# Patient Record
Sex: FEMALE | Race: WHITE
[De-identification: names, ages, dates, MRNs, and addresses within clinical notes are randomized per-mention and may not be internally consistent; named-entity substitution may affect disease eponyms.]

---

## 2018-03-12 ENCOUNTER — HOSPITAL ENCOUNTER (INPATIENT)
Dept: HOSPITAL 17 - NEPE | Age: 58
DRG: 208 | End: 2018-03-12
Attending: INTERNAL MEDICINE | Admitting: INTERNAL MEDICINE
Payer: MEDICAID

## 2018-03-12 VITALS — RESPIRATION RATE: 26 BRPM | HEART RATE: 98 BPM | DIASTOLIC BLOOD PRESSURE: 40 MMHG | SYSTOLIC BLOOD PRESSURE: 80 MMHG

## 2018-03-12 VITALS — DIASTOLIC BLOOD PRESSURE: 66 MMHG | SYSTOLIC BLOOD PRESSURE: 123 MMHG | HEART RATE: 120 BPM | RESPIRATION RATE: 24 BRPM

## 2018-03-12 VITALS — DIASTOLIC BLOOD PRESSURE: 40 MMHG | RESPIRATION RATE: 26 BRPM | HEART RATE: 100 BPM | SYSTOLIC BLOOD PRESSURE: 80 MMHG

## 2018-03-12 VITALS — OXYGEN SATURATION: 97 %

## 2018-03-12 VITALS — BODY MASS INDEX: 23.71 KG/M2 | WEIGHT: 138.89 LBS | HEIGHT: 64 IN

## 2018-03-12 VITALS — DIASTOLIC BLOOD PRESSURE: 49 MMHG | HEART RATE: 96 BPM | SYSTOLIC BLOOD PRESSURE: 109 MMHG

## 2018-03-12 VITALS — TEMPERATURE: 97.4 F | HEART RATE: 126 BPM

## 2018-03-12 VITALS — OXYGEN SATURATION: 99 %

## 2018-03-12 VITALS — DIASTOLIC BLOOD PRESSURE: 66 MMHG | SYSTOLIC BLOOD PRESSURE: 123 MMHG

## 2018-03-12 VITALS — TEMPERATURE: 98.3 F

## 2018-03-12 DIAGNOSIS — Z79.52: ICD-10-CM

## 2018-03-12 DIAGNOSIS — J98.11: ICD-10-CM

## 2018-03-12 DIAGNOSIS — I10: ICD-10-CM

## 2018-03-12 DIAGNOSIS — E83.52: ICD-10-CM

## 2018-03-12 DIAGNOSIS — J44.9: ICD-10-CM

## 2018-03-12 DIAGNOSIS — A41.9: ICD-10-CM

## 2018-03-12 DIAGNOSIS — R32: ICD-10-CM

## 2018-03-12 DIAGNOSIS — C79.31: ICD-10-CM

## 2018-03-12 DIAGNOSIS — Z66: ICD-10-CM

## 2018-03-12 DIAGNOSIS — J96.20: ICD-10-CM

## 2018-03-12 DIAGNOSIS — I73.9: ICD-10-CM

## 2018-03-12 DIAGNOSIS — R65.21: ICD-10-CM

## 2018-03-12 DIAGNOSIS — R40.2432: ICD-10-CM

## 2018-03-12 DIAGNOSIS — M19.90: ICD-10-CM

## 2018-03-12 DIAGNOSIS — F41.9: ICD-10-CM

## 2018-03-12 DIAGNOSIS — Z98.1: ICD-10-CM

## 2018-03-12 DIAGNOSIS — C78.89: ICD-10-CM

## 2018-03-12 DIAGNOSIS — C79.51: ICD-10-CM

## 2018-03-12 DIAGNOSIS — E03.9: ICD-10-CM

## 2018-03-12 DIAGNOSIS — C34.90: ICD-10-CM

## 2018-03-12 DIAGNOSIS — J96.00: Primary | ICD-10-CM

## 2018-03-12 DIAGNOSIS — Z79.891: ICD-10-CM

## 2018-03-12 DIAGNOSIS — Z87.891: ICD-10-CM

## 2018-03-12 DIAGNOSIS — Z79.899: ICD-10-CM

## 2018-03-12 DIAGNOSIS — N17.9: ICD-10-CM

## 2018-03-12 DIAGNOSIS — I46.9: ICD-10-CM

## 2018-03-12 DIAGNOSIS — D50.9: ICD-10-CM

## 2018-03-12 DIAGNOSIS — E87.1: ICD-10-CM

## 2018-03-12 DIAGNOSIS — M81.0: ICD-10-CM

## 2018-03-12 DIAGNOSIS — Z51.5: ICD-10-CM

## 2018-03-12 DIAGNOSIS — E87.2: ICD-10-CM

## 2018-03-12 DIAGNOSIS — E78.5: ICD-10-CM

## 2018-03-12 DIAGNOSIS — C78.7: ICD-10-CM

## 2018-03-12 DIAGNOSIS — R74.0: ICD-10-CM

## 2018-03-12 DIAGNOSIS — E87.5: ICD-10-CM

## 2018-03-12 LAB
ALBUMIN SERPL-MCNC: 1.6 GM/DL (ref 3.4–5)
ALP SERPL-CCNC: 959 U/L (ref 45–117)
ALT SERPL-CCNC: 984 U/L (ref 10–53)
APAP SERPL-MCNC: (no result) MCG/ML (ref 10–30)
AST SERPL-CCNC: 4063 U/L (ref 15–37)
BILIRUB SERPL-MCNC: 1.3 MG/DL (ref 0.2–1)
BUN SERPL-MCNC: 87 MG/DL (ref 7–18)
CALCIUM SERPL-MCNC: 11.7 MG/DL (ref 8.5–10.1)
CHLORIDE SERPL-SCNC: 92 MEQ/L (ref 98–107)
CREAT SERPL-MCNC: 4.88 MG/DL (ref 0.5–1)
GFR SERPLBLD BASED ON 1.73 SQ M-ARVRAT: 8 ML/MIN (ref 89–?)
GLUCOSE SERPL-MCNC: 65 MG/DL (ref 74–106)
HCO3 BLD-SCNC: 7.7 MEQ/L (ref 21–32)
INR PPP: 1.8 RATIO
LACTIC ACID SEPSIS PROTOCOL: 16.3 MMOL/L (ref 0.4–2)
PROT SERPL-MCNC: 5.8 GM/DL (ref 6.4–8.2)
PROTHROMBIN TIME: 18.7 SEC (ref 9.8–11.6)
SODIUM SERPL-SCNC: 130 MEQ/L (ref 136–145)
TROPONIN I SERPL-MCNC: 0.26 NG/ML (ref 0.02–0.05)

## 2018-03-12 PROCEDURE — 83690 ASSAY OF LIPASE: CPT

## 2018-03-12 PROCEDURE — 80307 DRUG TEST PRSMV CHEM ANLYZR: CPT

## 2018-03-12 PROCEDURE — 87205 SMEAR GRAM STAIN: CPT

## 2018-03-12 PROCEDURE — 84484 ASSAY OF TROPONIN QUANT: CPT

## 2018-03-12 PROCEDURE — 82550 ASSAY OF CK (CPK): CPT

## 2018-03-12 PROCEDURE — 82140 ASSAY OF AMMONIA: CPT

## 2018-03-12 PROCEDURE — 31500 INSERT EMERGENCY AIRWAY: CPT

## 2018-03-12 PROCEDURE — 94002 VENT MGMT INPAT INIT DAY: CPT

## 2018-03-12 PROCEDURE — 85730 THROMBOPLASTIN TIME PARTIAL: CPT

## 2018-03-12 PROCEDURE — 83605 ASSAY OF LACTIC ACID: CPT

## 2018-03-12 PROCEDURE — 80053 COMPREHEN METABOLIC PANEL: CPT

## 2018-03-12 PROCEDURE — 84443 ASSAY THYROID STIM HORMONE: CPT

## 2018-03-12 PROCEDURE — 76705 ECHO EXAM OF ABDOMEN: CPT

## 2018-03-12 PROCEDURE — 87040 BLOOD CULTURE FOR BACTERIA: CPT

## 2018-03-12 PROCEDURE — 96375 TX/PRO/DX INJ NEW DRUG ADDON: CPT

## 2018-03-12 PROCEDURE — 93005 ELECTROCARDIOGRAM TRACING: CPT

## 2018-03-12 PROCEDURE — 87641 MR-STAPH DNA AMP PROBE: CPT

## 2018-03-12 PROCEDURE — 85610 PROTHROMBIN TIME: CPT

## 2018-03-12 PROCEDURE — 0T9B70Z DRAINAGE OF BLADDER WITH DRAINAGE DEVICE, VIA NATURAL OR ARTIFICIAL OPENING: ICD-10-PCS

## 2018-03-12 PROCEDURE — 99292 CRITICAL CARE ADDL 30 MIN: CPT

## 2018-03-12 PROCEDURE — 0BH17EZ INSERTION OF ENDOTRACHEAL AIRWAY INTO TRACHEA, VIA NATURAL OR ARTIFICIAL OPENING: ICD-10-PCS

## 2018-03-12 PROCEDURE — 82552 ASSAY OF CPK IN BLOOD: CPT

## 2018-03-12 PROCEDURE — 96365 THER/PROPH/DIAG IV INF INIT: CPT

## 2018-03-12 PROCEDURE — 71045 X-RAY EXAM CHEST 1 VIEW: CPT

## 2018-03-12 PROCEDURE — 06HY33Z INSERTION OF INFUSION DEVICE INTO LOWER VEIN, PERCUTANEOUS APPROACH: ICD-10-PCS

## 2018-03-12 PROCEDURE — 5A1935Z RESPIRATORY VENTILATION, LESS THAN 24 CONSECUTIVE HOURS: ICD-10-PCS

## 2018-03-12 NOTE — PD
HPI


Chief Complaint:  Respiratory Distress


Time Seen by Provider:  10:49


Travel History


International Travel<30 days:  No


Contact w/Intl Traveler<30days:  No


Traveled to known affect area:  No





History of Present Illness


HPI


The patient is approximately 59 years old and arrives by EMS transport.  She 

arrives by EMS. The initial call was due to shortness of breath and altered 

mental status.  History is limited due to patient's altered mental status with 

a GCS of 7 (eyes 4, verbal 2, motor 1).  EMS reports the O2 sat was in the 70s 

upon their arrival.  With use of a BVM O2 sat increased to the 90s.  Patient's 

has stage IV cancer.  She is a DNR which was confirmed through her significant 

other at 6 years Mr. Sharif Trevino ([phone]).  The patient required a 

awake intubation within about 20 minutes of her ER arrival.  The significant 

other arrived to the ER we discussed the patient's likely clinical course which 

did not exclude termination within the next few hours.  Decision at that point 

was made to keep the patient as comfortable as possible.  Blood pressure 

decreased to 70/50.





PFSH


Past Medical History


Cancer:  Yes


Radiation Therapy:  Yes


Tetanus Vaccination:  Unknown


Pregnant?:  Unknown





Past Surgical History


Surgical History:  Unable to Obtain





Social History


Alcohol Use:  No


Tobacco Use:  No


Substance Use:  No





Allergies-Medications


(Allergen,Severity, Reaction):  


Coded Allergies:  


     Penicillins (Verified  Allergy, Unknown, 3/12/18)


Reported Meds & Prescriptions





Reported Meds & Active Scripts


Active


Reported


Ditropan (Oxybutynin Chloride) 5 Mg Tab 5 Mg PO Q12HR


Integra Plus (Multi-Vit/Iron-Vit C-B12-Folic Acid) 191-210-0.01-1 mg Cap 1 Cap 

PO DAILY


Zyrtec (Cetirizine HCl) 10 Mg Tablet 10 Mg PO DAILY


Lisinopril 20 Mg Tab 20 Mg PO BID


Hydrochlorothiazide 12.5 Mg Cap 12.5 Mg PO DAILY


Tricor (Fenofibrate) 145 Mg Tab 145 Mg PO DAILY


     Takw with food.


Ergocalciferol 50,000 Unit Cap 50,000 Units PO SUNDAY


Plavix (Clopidogrel Bisulfate) 75 Mg Tab 75 Mg PO DAILY


Lipitor (Atorvastatin Calcium) 10 Mg Tab 20 Mg PO HS


Aspirin Adult Low Strength (Aspirin) 81 Mg Tabdr 81 Mg PO DAILY


Norvasc (Amlodipine Besylate) 5 Mg Tab 5 Mg PO DAILY


Ventolin Hfa 18 GM Inh (Albuterol Sulfate) 90 Mcg/Act Aer 2 Puff INH Q6H PRN


Prednisone 10 Mg Tab 10 Mg PO BID


Ferrous Sulfate 325 Mg (65 Mg Iron) Tablet 325 Mg PO TID


Xanax (Alprazolam) 0.5 Mg Tab 1 Mg PO TID PRN 14 Days


Morphine ER (Morphine Sulfate) 30 Mg Tab 30 Mg PO Q12HR 14 Days


Morphine IR (Morphine Sulfate) 15 Mg Tab 15 Mg PO Q4H PRN 14 Days








Review of Systems


ROS Limitations:  Clinical Condition, Altered Mental Status





Physical Exam


Narrative


GENERAL: Approximately 59-year-old female moderate to severe distress secondary 

to weakness and/or pain





Vital Signs








  Date Time  Temp Pulse Resp B/P (MAP) Pulse Ox O2 Delivery O2 Flow Rate FiO2


 


3/12/18 11:20  96  109/49 (69)    


 


3/12/18 11:02 98.3       


 


3/12/18 11:00  96 26   Nasal Cannula 5.00 


 


3/12/18 10:55  98 26 80/40 (53)  Nasal Cannula 5.00 


 


3/12/18 10:48  100 26 80/40 (53)    








SKIN: Warm and dry.


HEAD: Atraumatic. Normocephalic. 


EYES: Pupils equal round and reactive to light.


ENT: No nasal bleeding or discharge.  Mucous membranes pink and moist.


NECK: Trachea midline. No JVD. 


CARDIOVASCULAR: Heart rate is approximately 100.  Rhythm is regular.


RESPIRATORY: Lung sounds are diminished bilaterally.  The respiratory rate 

about 24.  There is no wheezing or rhonchi.


GASTROINTESTINAL: Large midline surgical incision appears well-healed.


MUSCULOSKELETAL: Extremities without clubbing, cyanosis, or edema. No obvious 

deformities. 


NEUROLOGICAL: GCS is approximately 9 (Eyes 4, verbal 1, motor 4). Cranial 

nerves appears symmetric.


PSYCHIATRIC: Unable to assess.





Data


Data


Last Documented VS





Vital Signs








  Date Time  Temp Pulse Resp B/P (MAP) Pulse Ox O2 Delivery O2 Flow Rate FiO2


 


3/12/18 15:10  120  123/66    


 


3/12/18 12:55     0   100


 


3/12/18 11:02 98.3       


 


3/12/18 11:00   26   Nasal Cannula 5.00 








Orders





 Orders


Electrocardiogram (3/12/18 10:49)


Ammonia (3/12/18 10:49)


Complete Blood Count With Diff (3/12/18 10:49)


Comprehensive Metabolic Panel (3/12/18 10:49)


Creatine Kinase (Cpk) (3/12/18 10:49)


Prothrombin Time / Inr (Pt) (3/12/18 10:49)


Act Partial Throm Time (Ptt) (3/12/18 10:49)


Troponin I (3/12/18 10:49)


Thyroid Stimulating Hormone (3/12/18 10:49)


Urinalysis - C+S If Indicated (3/12/18 10:49)


Lactic Acid Sepsis Protocol (3/12/18 10:49)


Arterial Blood Gas (Abg) (3/12/18 10:49)


Blood Culture (3/12/18 10:49)


Chest, Single Ap (3/12/18 10:49)


Ct Brain W/O Iv Contrast(Rout) (3/12/18 10:49)


Blood Glucose (3/12/18 10:49)


Ecg Monitoring (3/12/18 10:49)


Iv Access Insert/Monitor (3/12/18 10:49)


Oximetry (3/12/18 10:49)


Sodium Chloride 0.9% Flush (Ns Flush) (3/12/18 11:00)


Drug Screen, Random Urine (3/12/18 10:49)


Alcohol (Ethanol) (3/12/18 10:49)


Salicylates (Aspirin) (3/12/18 10:49)


Tylenol (Acetaminophen) (3/12/18 10:49)


Lipase (3/12/18 10:49)


Ct Abd/Pel W Iv Contrast(Rout) (3/12/18 10:49)


Etomidate Inj (Amidate Inj) (3/12/18 11:24)


Ct Pulmonary Angiogram (3/12/18 )


Dextrose 25% In Water Inj (D25w Inj) (3/12/18 11:45)


Dextrose 50% In Rody (Syr) Inj (D50w (Syr (3/12/18 11:35)


Lorazepam Inj (Ativan Inj) (3/12/18 12:00)


Morphine Inj (Morphine Inj) (3/12/18 12:00)


Calcium Gluconate Inj (Calcium Gluconate (3/12/18 13:00)


Insulin Human Regular Inj (Novolin R Inj (3/12/18 13:15)


Dextrose 50% In Rody (Vial) Inj (D50w (Vi (3/12/18 13:00)


Sodium Bicarbonate 8.4% Inj (Sodium Bica (3/12/18 13:00)


Sodium Polysty Sulfate Liq (Kayexalate L (3/12/18 13:00)


Albuterol Neb (Albuterol Neb) (3/12/18 13:00)


Lorazepam Inj (Ativan Inj) (3/12/18 13:15)


Morphine Inj (Morphine Inj) (3/12/18 13:15)


CKMB (3/12/18 11:20)


CKMB% (3/12/18 11:20)


Hospice Consult (3/12/18 13:43)


Norepinephrine-Dextrose Drip (Levophed-D (3/12/18 15:00)


Lactic Acid Sepsis Protocol (3/12/18 14:14)


Norepinephrine Inj (Levophed Inj) (3/12/18 14:17)


Norepinephrine Inj (Levophed Inj) (3/12/18 14:18)


Lactic Acid (3/12/18 18:00)


Lactic Acid (3/13/18 00:00)


Lactic Acid (3/13/18 06:00)


Lactic Acid (3/13/18 12:00)


Lactic Acid (3/13/18 18:00)


Lactic Acid (3/14/18 00:00)


Lactic Acid (3/14/18 06:00)


Lactic Acid (3/14/18 12:00)


Lactic Acid (3/14/18 18:00)


Lactic Acid (3/15/18 00:00)


Lactic Acid (3/15/18 06:00)


Lactic Acid (3/15/18 12:00)


Lactic Acid (3/15/18 18:00)


Lactic Acid (3/16/18 00:00)


Lactic Acid (3/16/18 06:00)


Lactic Acid (3/16/18 12:00)


Admit Order (Ed Use Only) (3/12/18 )


Cardiac Monitor / Telemetry [id number] (3/12/18 15:11)


Vital Signs (Adult) Q4H (3/12/18 15:11)


Diet Npo (3/12/18 Dinner)


Activity Bed Rest (3/12/18 15:11)





Labs





Laboratory Tests








Test


  3/12/18


11:20


 


Blood Urea Nitrogen 87 MG/DL 


 


Creatinine 4.88 MG/DL 


 


Random Glucose 65 MG/DL 


 


Total Protein 5.8 GM/DL 


 


Albumin 1.6 GM/DL 


 


Calcium Level 11.7 MG/DL 


 


Alkaline Phosphatase 959 U/L 


 


Aspartate Amino Transf


(AST/SGOT) 4063 U/L 


 


 


Alanine Aminotransferase


(ALT/SGPT) 984 U/L 


 


 


Total Bilirubin 1.3 MG/DL 


 


Sodium Level 130 MEQ/L 


 


Potassium Level 6.7 MEQ/L 


 


Chloride Level 92 MEQ/L 


 


Carbon Dioxide Level 7.7 MEQ/L 


 


Anion Gap 30 MEQ/L 


 


Estimat Glomerular Filtration


Rate 8 ML/MIN 


 


 


Lactic Acid Level 16.3 mmol/L 


 


Protein Corrected Calcium  MG/DL 


 


Ammonia 164 MCMOL/L 


 


Total Creatine Kinase 1487 U/L 


 


Creatine Kinase MB 20.0 NG/ML 


 


Creatine Kinase MB % 1.3 % 


 


Troponin I 0.26 NG/ML 


 


Lipase 668 U/L 


 


Thyroid Stimulating Hormone


3rd Gen 8.430 uIU/ML 


 


 


Salicylates Level 12.3 MG/DL 


 


Acetaminophen Level


  LESS THAN 2.0


MCG/ML


 


Ethyl Alcohol Level


  LESS THAN 3


MG/DL











MDM


Medical Decision Making


Medical Screen Exam Complete:  Yes


Emergency Medical Condition:  Yes


Medical Record Reviewed:  Yes


Differential Diagnosis


PE, failure to thrive, electrolyte imbalance, anemia, infectious process


Narrative Course


Please for the history of present illness.  Ongoing comfort measures will be 

provided.  Hospice consultation ordered.  Patient's significant other Sharif Trevino is attempting to contact the patient's sister who lives out of state. 





Multiple family members were contacted and ultimately in conjunction with 

hospice consultation it was determined to sustain all resuscitative efforts 

until family could arrive to see the patient.





CBC & BMP Diagram


3/12/18 11:20








Total Protein 5.8 L, Albumin 1.6 L, Calcium Level 11.7 *H, Alkaline Phosphatase 

959 H, Aspartate Amino Transf (AST/SGOT) 4063 H, Alanine Aminotransferase (ALT/

SGPT) 984 H, Total Bilirubin 1.3 H





Hyperkalemic therapy given.


Patient intubated without RSI medications when she became unresponsive however 

pulses remained intact throughout ER stay


Right femoral vein central line placed


d/w Dr Tree Brooks started


Critical Care Narrative


Aggregate critical care time was 75 minutes. Time to perform other separately 

billable procedures was not  


included in the critical care time. My time did not include minutes spent 

treating any other patients simultaneously or on  


activities that did not directly contribute to the patient's treatment.  





The services I provided to this patient were to treat and/or prevent clinically 

significant deterioration that could result  


in: Cardiopulmonary arrest, septic shock





I provided critical care services requiring my management, as noted below:


Chart data review, documentation time, medication orders and management, vital 

sign assessments/reviewing monitor data,  


ordering and reviewing lab tests, ordering and interpreting/reviewing x-rays 

and diagnostic studies, care of the patient  


and discussion of the patient with the admitting physicians.





Procedures


**Procedure Narrative**


After the risks and benefits were discussed the following procedure was 

performed:


INTUBATION: The patient was put in optimal position for the procedure.  Awake 

intubation was performed wall secondary to altered mental status The patient 

was intubated with a 7-5 cuffed endotracheal  


tube.  Tube placement was confirmed by visualization of the tube and balloon 

passing through the cords, capnometry and  


subsequent chest x-ray.  Breath sounds were equal and well aerated bilaterally 

postintubation.  No breath sounds over  


stomach.  Patient tolerated procedure well.





Diagnosis





 Primary Impression:  


 Respiratory failure


 Qualified Codes:  J96.20 - Acute and chronic respiratory failure, unspecified 

whether with hypoxia or hypercapnia


 Additional Impressions:  


 Hyperkalemia


 Sepsis with multiple organ dysfunction (MOD)





Admitting Information


Admitting Physician Requests:  Robin Gonsales MD Mar 12, 2018 11:20

## 2018-03-12 NOTE — HHI.HP
[facility]


Service


Critical Care Medicine


Primary Care Physician


Unknown


Admission Diagnosis





Resp Failure; HyperK; Hypotension


Diagnosis:  


(1) Respiratory failure


Diagnosis:  Principal





(2) Sepsis with multiple organ dysfunction (MOD)


Diagnosis:  Principal





(3) Metastatic cancer to liver


Diagnosis:  Principal





(4) Lactic acidosis


Diagnosis:  Principal





(5) Allergic rhinitis


Diagnosis:  Secondary





(6) Urinary incontinence


Diagnosis:  Secondary





(7) History of high cholesterol


Diagnosis:  Secondary





(8) History of hypertension


Diagnosis:  Secondary





(9) Chronic narcotic use


Diagnosis:  Secondary





(10) Anxiety


Diagnosis:  Secondary





(11) Carotid artery disease


Diagnosis:  Secondary





(12) Hyperammonemia


Diagnosis:  Principal





(13) Elevated TSH


Diagnosis:  Secondary





(14) Elevated lipase


Diagnosis:  Secondary





(15) Elevated CPK


Diagnosis:  Principal





(16) Elevated levels of transaminase & lactic acid dehydrogenase


Diagnosis:  Principal





(17) Hypercalcemia


Diagnosis:  Principal





(18) Acute kidney injury


Diagnosis:  Principal





(19) Hyponatremia


Diagnosis:  Secondary





(20) Hyperkalemia


Diagnosis:  Principal





(21) Osteoporosis


Diagnosis:  Secondary





Chief Complaint:  


Patient presents from home is DNR with history of metastatic cancer lungs,


liver, pancreas possibly spine and brain DNR with altered mental status


Travel History


International Travel<30 Days:  No


Contact w/Intl Traveler <30 Da:  No


Traveled to Known Affected Are:  No





Sepsis Criteria


SIRS Criteria (2 or more):  Heart rate over 90, RR  > 20 or PaCO2 < 32


Sepsis Criteria (SIRS+source):  Infect source susp/known


Severe Sepsis (+one):  Lactate >2


Septic Shock Criteria:  Lactic acid >=4


Multiple Organ Dysfunction Syn:  Evidence -2 organs failing


Criteria Outcome:  Meets multiple organ dys. criteria


History of Present Illness


This is a 57-year-old  female.  Date of admission 3/12/2018.  Past 

medical history includes unknown primary cancer, seen by Dr. Hidalgo 

outpatient which she is receiving chemoradiation therapy with metastases known 

to liver, lung, pancreas possibly brain.  Patient has been weak but essentially 

her normal state of health recently.  She has been compliant with her 

medications according to her boyfriend Sharif Trevino.  Today she presented to 

[facility] as she notified EMS complaining of weakness, nausea and 

confusion.





She is evaluated by the ED physician who spoke to boyfriend Gustavo Trevino 

who agreed DNR status.  Contacted son Shukri [phone].  Initially wish to 

keep patient alive until family was available.  However after intubation he 

desires to withdraw care.





Patient is with multisystem organ failure including shock liver, acute kidney 

injury, hyperkalemia, non-STEMI and acute respiratory failure





Review of Systems


ROS Limitations:  Intubated





Past Family Social History


Allergies:  


Coded Allergies:  


     Penicillins (Verified  Allergy, Unknown, 3/12/18)


Past Medical History


Cancer unknown primary seen by Dr. Hidalgo status post chemoradiation therapy


Allergic rhinitis


Iron deficiency anemia


Carotid artery disease


Dyslipidemia


Hypertension


Chronic narcotic use


Chronic steroid use


Urinary incontinence


Osteoporosis/arthritis


Chronic benzodiazepine use


Past Surgical History


Left carotid enterectomy by Dr. Stern


Sigmoid colectomy with Muriel pouch with reversal by Dr. Walker


Hysterectomy


L4/5 fusion


Cholecystectomy


Bladder suspension


Reported Medications


Ditropan (Oxybutynin Chloride) 5 Mg Tab 5 Mg PO Q12HR


Integra Plus (Multi-Vit/Iron-Vit C-B12-Folic Acid) 191-210-0.01-1 mg Cap 1 Cap 

PO DAILY


Zyrtec (Cetirizine HCl) 10 Mg Tablet 10 Mg PO DAILY


Lisinopril 20 Mg Tab 20 Mg PO BID


Hydrochlorothiazide 12.5 Mg Cap 12.5 Mg PO DAILY


Tricor (Fenofibrate) 145 Mg Tab 145 Mg PO DAILY


     Takw with food.


Ergocalciferol 50,000 Unit Cap 50,000 Units PO 


Plavix (Clopidogrel Bisulfate) 75 Mg Tab 75 Mg PO DAILY


Lipitor (Atorvastatin Calcium) 10 Mg Tab 20 Mg PO HS


Aspirin Adult Low Strength (Aspirin) 81 Mg Tabdr 81 Mg PO DAILY


Norvasc (Amlodipine Besylate) 5 Mg Tab 5 Mg PO DAILY


Ventolin Hfa 18 GM Inh (Albuterol Sulfate) 90 Mcg/Act Aer 2 Puff INH Q6H PRN


Prednisone 10 Mg Tab 10 Mg PO BID


Ferrous Sulfate 325 Mg (65 Mg Iron) Tablet 325 Mg PO TID


Xanax (Alprazolam) 0.5 Mg Tab 1 Mg PO TID PRN 14 Days


Morphine ER (Morphine Sulfate) 30 Mg Tab 30 Mg PO Q12HR 14 Days


Morphine IR (Morphine Sulfate) 15 Mg Tab 15 Mg PO Q4H PRN 14 Days


Active Ordered Medications


Reviewed in EMR


Family History


No documented previous history..  Patient has 2 sons one is  Shukri.  The other 

son is not in contact.


Social History


Prior tobaccoism.  Denies history of alcohol use.





Physical Exam


Vital Signs





Vital Signs








  Date Time  Temp Pulse Resp B/P (MAP) Pulse Ox O2 Delivery O2 Flow Rate FiO2


 


3/12/18 15:10  120  123/66    


 


3/12/18 14:56  102  106/75    


 


3/12/18 14:47  93  50/32    


 


3/12/18 14:41  77  41/20    


 


3/12/18 14:31  79  46/26    


 


3/12/18 14:27  75  53/24    


 


3/12/18 14:26  75  59/31    


 


3/12/18 12:55     0   100


 


3/12/18 11:20  96  109/49 (69)    


 


3/12/18 11:02 98.3       


 


3/12/18 11:00  96 26   Nasal Cannula 5.00 


 


3/12/18 10:55  98 26 80/40 (53)  Nasal Cannula 5.00 


 


3/12/18 10:48  100 26 80/40 (53)    








Physical Exam


GENERAL: 57-year-old  female currently orotracheally intubated


SKIN: Warm and dry.


HEAD: Atraumatic. Normocephalic. 


EYES: Pupils equal and round. No scleral icterus. No injection or drainage. 


ENT: No nasal bleeding or discharge.  Mucous membranes pink and moist.


NECK: Trachea midline. No JVD. 


CARDIOVASCULAR: Tachycardic, RR.  S1, S3 no S4 without murmur


RESPIRATORY: No accessory muscle use. Clear to auscultation. Breath sounds 

equal bilaterally. 


GASTROINTESTINAL: Abdomen soft, protuberant.  Hypoactive bowel sounds 

appreciated.


MUSCULOSKELETAL: Extremities with trace bilateral lower extremity edema. No 

obvious deformities. 


NEUROLOGICAL: Currently sedated on the ventilator.  Positive gag and corneal 

reflex.


Laboratory





Laboratory Tests








Test


  3/12/18


11:20


 


Blood Urea Nitrogen 87 


 


Creatinine 4.88 


 


Random Glucose 65 


 


Total Protein 5.8 


 


Albumin 1.6 


 


Calcium Level 11.7 


 


Alkaline Phosphatase 959 


 


Aspartate Amino Transf


(AST/SGOT) 4063 


 


 


Alanine Aminotransferase


(ALT/SGPT) 984 


 


 


Total Bilirubin 1.3 


 


Sodium Level 130 


 


Potassium Level 6.7 


 


Chloride Level 92 


 


Carbon Dioxide Level 7.7 


 


Anion Gap 30 


 


Estimat Glomerular Filtration


Rate 8 


 


 


Lactic Acid Level 16.3 


 


Protein Corrected Calcium  


 


Ammonia 164 


 


Total Creatine Kinase 1487 


 


Creatine Kinase MB 20.0 


 


Creatine Kinase MB % 1.3 


 


Troponin I 0.26 


 


Lipase 668 


 


Thyroid Stimulating Hormone


3rd Gen 8.430 


 


 


Salicylates Level 12.3 


 


Acetaminophen Level LESS THAN 2.0 


 


Ethyl Alcohol Level LESS THAN 3 














 Date/Time


Source Procedure


Growth Status


 


 


 3/12/18 11:25


Blood Peripheral Aerobic Blood Culture


Pending Received


 


 3/12/18 11:25


Blood Peripheral Anaerobic Blood Culture


Pending Received








Result Diagram:  


3/12/18 1120





Imaging





Last Impressions








Chest X-Ray 3/12/18 1049 Signed





Impressions: 





 Service Date/Time:  2018 12:17 - CONCLUSION:  1. Support 





 apparatus in satisfactory position. Minimal basal atelectasis.     Duane Maxwell MD 











Septic Shock Reassessment


Septic shock perfusion:  reassessment completed





Caprini VTE Risk Assessment


Caprini VTE Risk Assessment:  Mod/High Risk (score >= 2)


Caprini Risk Assessment Model











 Point Value = 1          Point Value = 2  Point Value = 3  Point Value = 5


 


Age 41-60


Minor surgery


BMI > 25 kg/m2


Swollen legs


Varicose veins


Pregnancy or postpartum


History of unexplained or recurrent


   spontaneous 


Oral contraceptives or hormone


   replacement


Sepsis (< 1 month)


Serious lung disease, including


   pneumonia (< 1 month)


Abnormal pulmonary function


Acute myocardial infarction


Congestive heart failure (< 1 month)


History of inflammatory bowel disease


Medical patient at bed rest Age 61-74


Arthroscopic surgery


Major open surgery (> 45 min)


Laparoscopic surgery (> 45 min)


Malignancy


Confined to bed (> 72 hours)


Immobilizing plaster cast


Central venous access Age >= 75


History of VTE


Family history of VTE


Factor V Leiden


Prothrombin 09992Q


Lupus anticoagulant


Anticardiolipin antibodies


Elevated serum homocysteine


Heparin-induced thrombocytopenia


Other congenital or acquired


   thrombophilia Stroke (< 1 month)


Elective arthroplasty


Hip, pelvis, or leg fracture


Acute spinal cord injury (< 1 month)








Prophylaxis Regimen











   Total Risk


Factor Score Risk Level Prophylaxis Regimen


 


0-1      Low Early ambulation


 


2 Moderate Order ONE of the following:


*Sequential Compression Device (SCD)


*Heparin 5000 units SQ BID


 


3-4 Higher Order ONE of the following medications:


*Heparin 5000 units SQ TID


*Enoxaparin/Lovenox 40 mg SQ daily (WT < 150 kg, CrCl > 30 mL/min)


*Enoxaparin/Lovenox 30 mg SQ daily (WT < 150 kg, CrCl > 10-29 mL/min)


*Enoxaparin/Lovenox 30 mg SQ BID (WT < 150 kg, CrCl > 30 mL/min)


AND/OR


*Sequential Compression Device (SCD)


 


5 or more Highest Order ONE of the following medications:


*Heparin 5000 units SQ TID (Preferred with Epidurals)


*Enoxaparin/Lovenox 40 mg SQ daily (WT < 150 kg, CrCl > 30 mL/min)


*Enoxaparin/Lovenox 30 mg SQ daily (WT < 150 kg, CrCl > 10-29 mL/min)


*Enoxaparin/Lovenox 30 mg SQ BID (WT < 150 kg, CrCl > 30 mL/min)


AND


*Sequential Compression Device (SCD)











Assessment and Plan


Assessment and Plan


Neuro/Psych:





Allergic rhinitis


Chronic benzodiazepine use


Chronic narcotic use





Patient is currently on propofol/fentanyl drips for sedation/analgesia while 

intubated


Goal RASS -2


Daily sedation vacation


Currently holding cetirizine 10 mg p.o. daily/home medication


Holding alprazolam 0.5 mg p.o. every 6 hours as needed anxiety


Holding morphine sulfate 30 mg twice daily/50 mg every 4 hours as needed pain





CV: 





Elevated troponin


Severe sepsis with multisystem organ failure


History of hypertension


History of dyslipidemia


History of left carotid endarterectomy


Lactic acidosis





Currently on norepinephrine at 12 migrans per minute to maintain mean arterial 

pressures greater than equal to 65


Serial lactates every 6 hours until cleared


Serial troponins every 6 hours 2


Follow-up on EKG


Holding atorvastatin 20 mg p.o. daily/home medication light of elevated 

transaminases


Holding amlodipine 5 mg daily lisinopril 20 mg twice daily acute kidney injury/

hypotension


Continue aspirin 81 mg p.o. daily and clopidogrel 75 mg p.o. daily





Resp: 





Acute respiratory failure


COPD





[facility] 16/525//100


Ventilator bundle


Albuterol/ipratropium aerosols every 6 hours with albuterol aerosols every 2 

hours as needed dyspnea


Spontaneous breathing trials when clinically indicated


Postintubation ABG/chest x-ray pending





GI: 





Elevated transaminases


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


Elevated lipase


Hyperammonia





OGT to LIWS


Famotidine 10 mg IV twice daily for GI prophylaxis


Docusate/senna 1 tablet twice daily for bowel regimen


Follow-up on liver ultrasound


Avoid hepatotoxic medications


Lactulose 30 cc 4 times daily.  Recheck ammonia level in a.m.





:  





History of bladder incontinence





Byrd catheter for accurate I's and O's


Currently holding oxybutynin 5 mg p.o. daily





Endo:  





History of hypothyroidism





TSH is currently elevated 8.4.


Check free T3 C4 indicated





Renal: 





Acute kidney injury





Creatinine currently 4.9.  Currently anuric.


Currently on sterile water with 3 ampules of sodium bicarbonate 150 cc an hour


Monitor urine output


Accurate I's and O's





Heme:





CBC and coags pending





ID:





Severe sepsis unknown etiology





Vancomycin, aztreonam and metronidazole





Blood cultures 2, sputum and urine ordered





MSK:





Osteoarthritis





PT evaluate and treat





FEN: 





Hyponatremia


Hyperkalemia





Received insulin/D50/Kayexalate/albuterol and bicarbonate.  Recheck in 3 hours





Access


-Right femoral central line day #1 placed 3/12 by ED physician





Prophylax


--GI -famotidine


-DVT -SCD/holding pharmacological prophylaxis pending coags





Critical Care:


The total critical care time was 35 minutes. Time to perform other separately 

billable procedures was not included in the critical care time.


Code Status


DNR


Discussed Condition With


Atilio Watt at [phone] boyfriend Sharif Trevino.  Care plan discussed and 

all questions answered.  No escalation of care.  Norepinephrine maximum 12 mcg/

min.  Continue with bicarbonate drip and antibiotic support for now with 

propofol and fentanyl drips for sedation/analgesia.





Son was able to fax appropriate paperwork to [employer].  I will sign.  Awaiting 

Dr. Rogers to cosign a second signature.





Problem Qualifiers





(1) Respiratory failure:  


Qualified Codes:  J96.20 - Acute and chronic respiratory failure, unspecified 

whether with hypoxia or hypercapnia


(2) Allergic rhinitis:  


Qualified Codes:  J30.1 - Allergic rhinitis due to pollen


(3) Urinary incontinence:  


Qualified Codes:  R32 - Unspecified urinary incontinence


(4) Carotid artery disease:  


Qualified Codes:  I77.9 - Disorder of arteries and arterioles, unspecified


(5) Osteoporosis:  


Qualified Codes:  M81.0 - Age-related osteoporosis without current pathological 

fracture








Bentley Leavitt MD Mar 12, 2018 16:07

## 2018-03-12 NOTE — PD.CONS
Consult


Service


Palliative Care


Consult Requested By


Dr. Leavitt


.


Primary Care Physician


Unknown


 .


Reason for Consultation


   a.  To assist with evaluation and management of symptoms including: Dyspnea, 

pain


   b.  To assist medical decision maker(s) with: better understanding of 

current medical conditions; weighing benefits/burdens of medical treatment 

options; making        


        medical treatment decisions.


.





HPI


History of Present Illness


This 57-year-old female, with a past history of stage IV lung cancer with 

widespread metastases, COPD, peripheral arterial disease, and prior alcohol 

abuse, presented to the emergency department on 3/12/18 because of dyspnea and 

altered mental status.  The symptoms had reportedly been getting worse for a 

couple days.  In the emergency department, findings included:


* Nearly unresponsive


* Evidence of profound sepsis


* Potassium 6.7, creatinine 4.88


* Chest x-ray with atelectasis





The patient's son reports she has had lung cancer "for a couple years" and that 

she underwent radiation and chemotherapy.





The patient's longtime boyfriend was present and told the emergency department 

staff that the patient was a DNR, however there was no paperwork and the 

patient was intubated in the emergency department and admitted up to the 

intensive medical care unit.  She is being placed on fentanyl and propofol for 

comfort.





Palliative care was consulted to assist with symptom management, and to enter 

into discussions with the patient's family regarding her illness, the prognosis

, and the benefits and burdens of the various treatment choices.


.


Function/Cognitive Trajectory


The patient was reportedly ambulatory until the last couple days


.





Review of Systems


ROS Limitations:  Clinical Condition (Unresponsive, intubated.  History from 

the patient's son and from hospital staff)


Constitutional:  COMPLAINS OF: Weight loss


Endocrine:  DENIES: Polyuria


Eyes:  DENIES: Eye inflammation


Ears, nose, mouth, throat:  DENIES: Epistaxis


Respiratory:  COMPLAINS OF: Shortness of breath


Cardiovascular:  DENIES: Lower Extremity Edema


Gastrointestinal:  DENIES: Bloody stools, Diarrhea


Genitourinary:  DENIES: Hematuria


Musculoskeletal:  DENIES: Joint Swelling


Hematologic/Lymphatics:  COMPLAINS OF: Bruising


Neurologic:  DENIES: Seizures


Psychiatric:  COMPLAINS OF: Confusion, DENIES: Agitation





Past Family Social History


Coded Allergies:  


     Penicillins (Verified  Allergy, Unknown, 3/12/18)


Past Medical History


* Stage IV lung cancer, metastases to brain, liver


* COPD


* History of alcohol abuse


* Peripheral vascular disease


* History of carotid stenosis


* History of diverticulitis


.


Past Surgical History


* Left carotid endarterectomy 


* Colectomy/colostomy with reversal


* Cholecystectomy


* Back surgery


* Hysterectomy


.


Reported Medications





Reported Meds & Active Scripts


Active


Reported


Ditropan (Oxybutynin Chloride) 5 Mg Tab 5 Mg PO Q12HR


Integra Plus (Multi-Vit/Iron-Vit C-B12-Folic Acid) 191-210-0.01-1 mg Cap 1 Cap 

PO DAILY


Zyrtec (Cetirizine HCl) 10 Mg Tablet 10 Mg PO DAILY


Lisinopril 20 Mg Tab 20 Mg PO BID


Hydrochlorothiazide 12.5 Mg Cap 12.5 Mg PO DAILY


Tricor (Fenofibrate) 145 Mg Tab 145 Mg PO DAILY


     Takw with food.


Ergocalciferol 50,000 Unit Cap 50,000 Units PO 


Plavix (Clopidogrel Bisulfate) 75 Mg Tab 75 Mg PO DAILY


Lipitor (Atorvastatin Calcium) 10 Mg Tab 20 Mg PO HS


Aspirin Adult Low Strength (Aspirin) 81 Mg Tabdr 81 Mg PO DAILY


Norvasc (Amlodipine Besylate) 5 Mg Tab 5 Mg PO DAILY


Ventolin Hfa 18 GM Inh (Albuterol Sulfate) 90 Mcg/Act Aer 2 Puff INH Q6H PRN


Prednisone 10 Mg Tab 10 Mg PO BID


Ferrous Sulfate 325 Mg (65 Mg Iron) Tablet 325 Mg PO TID


Xanax (Alprazolam) 0.5 Mg Tab 1 Mg PO TID PRN 14 Days


Morphine ER (Morphine Sulfate) 30 Mg Tab 30 Mg PO Q12HR 14 Days


Morphine IR (Morphine Sulfate) 15 Mg Tab 15 Mg PO Q4H PRN 14 Days


.





Current Medications








 Medications


  (Trade)  Dose


 Ordered  Sig/Mohinder


 Route  Start Time


 Stop Time Status Last Admin


 


  (NS Flush)  2 ml  UNSCH  PRN


 IV FLUSH  3/12/18 15:15


     


 


 


  (NS Flush)  2 ml  BID


 IV FLUSH  3/12/18 21:00


     


 


 


  (Pepcid Inj)  10 mg  Q12HR


 IV PUSH  3/12/18 21:00


     


 


 


  (Tears Naturale


 Opth Soln)  1 drop  TID


 EACH EYE  3/12/18 18:00


     


 


 


  (Zofran Inj)  4 mg  Q6H  PRN


 IV PUSH  3/12/18 15:15


     


 


 


  (Duoneb Neb)  1 ampule  Q4HR  NEB


 INH  3/12/18 16:00


     


 


 


  (Albuterol Neb)  2.5 mg  Q2HR NEB  PRN


 INH  3/12/18 15:15


     


 


 


 Miscellaneous


 Information  1  Q361D


 XX  3/12/18 15:15


     


 


 


  (Chlorhexidine


 2% Cloth)  3 pack


 Taper  DAILY@04


 TOP  3/13/18 04:00


 3/9/19 03:59   


 


 


  (Chlorhexidine


 2% Cloth)  3 pack  UNSCH  PRN


 TOP  3/12/18 15:15


     


 


 


  (Libby-Colace)  1 tab  BID


 PO  3/12/18 21:00


     


 


 


  (Milk Of


 Magnesia Liq)  30 ml  Q12H  PRN


 PO  3/12/18 15:15


     


 


 


  (Senokot)  17.2 mg  Q12H  PRN


 PO  3/12/18 15:15


     


 


 


  (Dulcolax Supp)  10 mg  DAILY  PRN


 RECTAL  3/12/18 15:15


     


 


 


  (Lactulose Liq)  30 ml  DAILY  PRN


 PO  3/12/18 15:15


     


 


 


  (Peridex 0.12%


 Liq)  15 ml  BID@08,20


 MT  3/12/18 20:00


     


 


 


 Propofol  100 ml @ 


 1.89 mls/hr  TITRATE  PRN


 IV  3/12/18 15:15


     


 


 


 Fentanyl Citrate  250 ml @ 5


 mls/hr  TITRATE  PRN


 IV  3/12/18 15:15


     


 


 


  (D50w (Vial) Inj)  50 ml  UNSCH  PRN


 IV PUSH  3/12/18 15:15


     


 


 


  (Glucagon Inj)  1 mg  UNSCH  PRN


 OTHER  3/12/18 15:15


     


 


 


  (NovoLIN R


 SUPPLEMENTAL


 SCALE)  1  Q4HR


 SQ  3/12/18 16:00


     


 


 


 Vancomycin HCl


 1000 mg/Sodium


 Chloride  250 ml @ 


 250 mls/hr  ONCE  ONCE


 IV  3/12/18 18:00


 3/12/18 18:59   


 


 


 Aztreonam 1000 mg/


 Sodium Chloride  100 ml @ 


 200 mls/hr  Q12H


 IV  3/12/18 17:00


     


 


 


 Metronidazole  100 ml @ 


 100 mls/hr  Q6H


 IV  3/12/18 16:00


     


 


 


  (Lactulose Liq)  30 ml  QID


 PO  3/12/18 18:00


     


 


 


 Sodium


 Bicarbonate 150


 meq/Sterile Water  1,000 ml @ 


 150 mls/hr  Q6H40M


 IV  3/12/18 17:00


     


 


 


 Norepinephrine


 Bitartrate 4 mg/


 Sodium Chloride  250 ml @ 


 7.5 mls/hr  TITRATE  PRN


 IV  3/12/18 17:00


     


 


 


  (Brethine Inj)  1 mg  UNSCH  PRN


 SQ  3/12/18 17:00


     


 


 


 Vasopressin 40


 units/Dextrose  100 ml @ 6


 mls/hr  [id number]


 IV  3/12/18 17:00


     


 








Family History


Both of the patient's parents  of cancers, and the son reports "there is a 

lot of cancer in the family."


.


Substance Use


Tobacco: One half pack per day for many years


Alcohol: History of abuse, reportedly none recently


Prescription med abuse: None


Illicits: None


.


Psychosocial History


The patient was originally from [place] but has lived in [place] for many 

years.





She lives with longtime significant other Sharif Trevino.





She had 2 sons, one in [place], and 1 who is reportedly "not around and does 

not care."


.


Spiritual/Cultural Factors


Son reports she has a [employer]ism background and he would welcome  visits 

with her


.


Living Will:  Completed, but not made available


Health Care Surrogate:  Completed, but not made available


Health Care Surrogate(s):


Son reports that he is the surrogate


.


Documented care wishes:


She reportedly has a living will with typical language concerning end-stage 

conditions and terminal conditions


.


Family/friends goals:


The patient's son Shukri and the patient's longtime significant other Sharif Trevino both have requested withdrawal of life support to allow her to die 

peacefully.


.


Ethical and Legal Issues


There are no ethical issues that would impact her care or decision making at 

this time.





The patient lacks capacity and will not regain that capacity.  Her son Shukri 

is the healthcare proxy decision-maker.


.





Physical Exam





Vital Signs








  Date Time  Temp Pulse Resp B/P (MAP) Pulse Ox O2 Delivery O2 Flow Rate FiO2


 


3/12/18 16:56     99   100


 


3/12/18 16:36        100


 


3/12/18 16:35    123/66 (85)    


 


3/12/18 16:20     97   100


 


3/12/18 16:00  120 24 123/66 (85)    


 


3/12/18 15:10  120  123/66    


 


3/12/18 14:56  102  106/75    


 


3/12/18 14:47  93  50/32    


 


3/12/18 14:41  77  41/20    


 


3/12/18 14:31  79  46/26    


 


3/12/18 14:27  75  53/24    


 


3/12/18 14:26  75  59/31    


 


3/12/18 12:55     0   100


 


3/12/18 11:20  96  109/49 (69)    


 


3/12/18 11:02 98.3       


 


3/12/18 11:00  96 26   Nasal Cannula 5.00 


 


3/12/18 10:55  98 26 80/40 (53)  Nasal Cannula 5.00 


 


3/12/18 10:48  100 26 80/40 (53)    








Exam


CONSTITUTIONAL/GENERAL: This is a cachectic, unresponsive patient, intubated


TUBES/LINES/DRAINS: Endotracheal tube, IV access


SKIN: No jaundice, rashes, or lesions. Ecchymoses on upper extremities. No 

wounds seen anteriorly. Skin temperature appropriate. Not diaphoretic. 


HEAD: Atraumatic. Normocephalic.


EYES: Pupils equal and round and reactive.  No scleral icterus. No injection or 

drainage. Fundi not examined.


ENT:  Nose without bleeding or purulent drainage. Throat without visible 

erythema, exudates, masses, or lesions.


NECK: Trachea midline. Supple, nontender. No palpable thyroid enlargement or 

nodularity. 


CARDIOVASCULAR: Regular rate and rhythm without murmurs, gallops, or rubs. No 

JVD. Peripheral pulses symmetric.


RESPIRATORY/CHEST: Symmetric, unlabored respirations.  Coarse rhonchi present


GASTROINTESTINAL: Abdomen soft, non-tender, nondistended. No hepato-splenomegaly

, or palpable masses. No guarding. Bowel sounds present.


GENITOURINARY: Without palpable bladder distension. Byrd catheter in place.


MUSCULOSKELETAL: Extremities without clubbing, cyanosis, or edema. No joint 

tenderness or effusion noted. No calf tenderness. No mottling or clubbing.


LYMPHATICS: No palpable cervical or supraclavicular adenopathy.


NEUROLOGICAL: Minimally responsive


PSYCHIATRIC: Unable to assess due to clinical condition


.





Diagnostic Tests


Laboratory





Laboratory Tests








Test


  3/12/18


11:20 3/12/18


16:15


 


Blood Urea Nitrogen


  87 MG/DL


(7-18) 


 


 


Creatinine


  4.88 MG/DL


(0.50-1.00) 


 


 


Random Glucose


  65 MG/DL


() 


 


 


Total Protein


  5.8 GM/DL


(6.4-8.2) 


 


 


Albumin


  1.6 GM/DL


(3.4-5.0) 


 


 


Calcium Level


  11.7 MG/DL


(8.5-10.1) 


 


 


Alkaline Phosphatase


  959 U/L


() 


 


 


Aspartate Amino Transf


(AST/SGOT) 4063 U/L


(15-37) 


 


 


Alanine Aminotransferase


(ALT/SGPT) 984 U/L


(10-53) 


 


 


Total Bilirubin


  1.3 MG/DL


(0.2-1.0) 


 


 


Sodium Level


  130 MEQ/L


(136-145) 


 


 


Potassium Level


  6.7 MEQ/L


(3.5-5.1) 


 


 


Chloride Level


  92 MEQ/L


() 


 


 


Carbon Dioxide Level


  7.7 MEQ/L


(21.0-32.0) 


 


 


Anion Gap


  30 MEQ/L


(5-15) 


 


 


Estimat Glomerular Filtration


Rate 8 ML/MIN (>89) 


  


 


 


Lactic Acid Level


  16.3 mmol/L


(0.4-2.0) 


 


 


Protein Corrected Calcium


  MG/DL


(8.5-10.1) 


 


 


Ammonia


  164 MCMOL/L


(11-32) 


 


 


Total Creatine Kinase


  1487 U/L


() 


 


 


Creatine Kinase MB


  20.0 NG/ML


(0.5-3.6) 


 


 


Creatine Kinase MB %


  1.3 %


(0.0-4.0) 


 


 


Troponin I


  0.26 NG/ML


(0.02-0.05) 


 


 


Lipase


  668 U/L


() 


 


 


Thyroid Stimulating Hormone


3rd Gen 8.430 uIU/ML


(0.358-3.740) 


 


 


Salicylates Level


  12.3 MG/DL


(2.8-20.0) 


 


 


Acetaminophen Level


  LESS THAN 2.0


MCG/ML 


 


 


Ethyl Alcohol Level


  LESS THAN 3


MG/DL (0-5) 


 








Result Diagram:  


3/12/18 1120





Microbiology





Microbiology








 Date/Time


Source Procedure


Growth Status


 


 


 3/12/18 11:25


Blood Peripheral Aerobic Blood Culture


Pending Received


 


 3/12/18 11:25


Blood Peripheral Anaerobic Blood Culture


Pending Received





 3/12/18 11:20


Blood Peripheral Aerobic Blood Culture


Pending Received


 


 3/12/18 11:20


Blood Peripheral Anaerobic Blood Culture


Pending Received








Imaging





Last Impressions








Chest X-Ray 3/12/18 1049 Signed





Impressions: 





 Service Date/Time:  2018 12:17 - CONCLUSION:  1. Support 





 apparatus in satisfactory position. Minimal basal atelectasis.     Duane Maxwell MD 








Procedures


Intubation in the emergency department


.





Patient/Family Conference


Present at Family Conference:


Patient's son Shukri by telephone


.


Family Conference Time (mins):  25


Family Conference Location:  Telephone


Issues Discussed:


* Palliative care role, purpose, approach


* Additional medical, psychosocial, and spiritual history


* Patients general health, functional status, and cognitive changes in the 

months leading up to the current hospitalization


* Patient/family understanding of the current medical problems


* Patient/family understanding of prognosis


* Patients goals of care as best understood from advance directives and/or 

conversations and/or values


* Current medical treatment options and benefits/burdens of those options


* Likely scenarios comparing ongoing aggressive care with a transition to 

comfort measures only


* Questions answered to the best of my ability


* Palliative care contact information provided





Assessment and Plan


Disease Oriented Problem List:  


(1) Sepsis, hypotension


(2) Lung cancer, widely metastatic


(3) COPD


(4) History of alcohol abuse


(5) History of carotid stenosis


(6) Peripheral vascular disease


(7) History of diverticulitis and surgery


Symptom Scale:  


(1) Pain


0-10 Scale:  Unable to quantify





(2) Dyspnea


0-10 Scale:  Unable to quantify





Pertinent Non-Medical Issues


Psychosocial: , lives with significant other for several years.  2 sons 

but 1 is unreachable and somewhat estranged


Spiritual: [employer]ism background, son welcomes  visit for her


Legal: Patient lacks capacity and will not regain that capacity.  Son is proxy 

decision-maker


Ethical issues impacting care: None


.


Important Contacts


Son: Shukri [phone]


.


Prognosis


The patient is actively dying, terminal


.


Code Status:  No Code


Plan


* DO NOT RESUSCITATE, per request of son and longtime significant other who 

tell me she has had a DNR for a while but they cannot find it now


* DNR order entered prior to my consult


* GOALS: The patient's son tells me (and significant other agrees) that they 

want to transition to comfort measures only, and they are requesting that we de-

escalate and withdraw care and allow her to die peacefully and comfortably on 

medications for comfort.  Son is arranging to go to a local store with a fax 

machine to sign for the withdrawal of life support.


* DECISION-MAKING: The patient lacks capacity and will not regain capacity.  

Son Shukri is the proxy decision maker.


* SYMPTOMS: Being managed by propofol and fentanyl at this time, I am working 

in conjunction with Dr. Leavitt at this time


* Palliative Care will continue to follow the patient during this 

hospitalization.


.





Thank you for the opportunity to participate in the care of Ms. [id number].











Kristin Rogers MD Mar 12, 2018 17:09

## 2018-03-12 NOTE — RADRPT
EXAM DATE/TIME:  03/12/2018 15:36 

 

[facility] COMPARISON:     

No previous studies available for comparison.

        

 

 

INDICATIONS :     

Increased lab values. 

                     

 

MEDICAL HISTORY :           

Cancer. Radiation therapy. 

 

SURGICAL HISTORY :     

None.     

 

ENCOUNTER:     

Initial

 

ACUITY:     

1 day

 

PAIN SCORE:     

Nonresponsive.

 

LOCATION:     

Bilateral upper quadrant 

                     

MEASUREMENTS:     

 

LIVER:     

16.5 cm length 

 

COMMON DUCT:     

3 mm

 

RIGHT KIDNEY:     

11.4 x 4.4 x 5.2  cm

 

SPLEEN:     

11.1  cm length

 

FINDINGS:     

 

LIVER:     

There is heterogeneous echotexture with multiple mass lesions identified throughout the liver consist
ent with widespread metastatic disease. No intrahepatic biliary ductal dilation is identified. There 
is an exophytic mass in Morison's pouch measuring approximately 2.8 x 2.8 cm presumably representing 
an enlarged lymph node or serosal metastases.

 

COMMON DUCT:     

No intraluminal mass or stone visualized.  

 

GALLBLADDER:     

The gallbladder is not identified. Presumably, the patient is post cholecystectomy.

 

PANCREAS:     

The pancreas was not visualized the 2 overlying bowel gas.

 

RIGHT KIDNEY:     

No hydronephrosis, stone or mass.  

 

SPLEEN:     

No focal lesion.  

 

CONCLUSION:     

1. The gallbladder is not identified, presumably the patient is post cholecystectomy.

2. There are multiple mass lesions should be throughout the liver consistent with metastatic disease.


3. There is a exophytic mass measuring 2.8 x 2.8 cm possibly representing an enlarged node or serosal
 metastatic deposit.

4. Minimal free fluid seen within the abdomen.

5. The common duct is normal in caliber.

 

 

 

 Robin Lehman MD on March 12, 2018 at 17:26           

Board Certified Radiologist.

 This report was verified electronically.

## 2018-03-12 NOTE — RADRPT
EXAM DATE/TIME:  03/12/2018 12:17 

 

[facility] COMPARISON:     

No previous studies available for comparison.

 

                     

INDICATIONS :     

Evaluate post intubation.

                     

 

MEDICAL HISTORY :     

None.          

 

SURGICAL HISTORY :     

None.   

 

ENCOUNTER:     

Initial                                        

 

ACUITY:     

1 day      

 

PAIN SCORE:     

Non-responsive.

 

LOCATION:     

Bilateral chest 

 

FINDINGS:     

A single view of the chest demonstrates endotracheal tube in good position. NG tip in stomach. Minima
l basilar dependent airspace disease in the lungs. No effusion or pneumothorax. Heart size upper limi
ts normal.

 

CONCLUSION:     

1. Support apparatus in satisfactory position. Minimal basal atelectasis.

 

 

 

 Duane Maxwell MD on March 12, 2018 at 12:30           

Board Certified Radiologist.

 This report was verified electronically.

## 2018-03-12 NOTE — EKG
Date Performed: 03/12/2018       Time Performed: 10:44:32

 

PTAGE:      89 years

 

EKG:      SINUS TACHYCARDIA WITH FIRST DEGREE AV BLOCK POSSIBLE LEFT ATRIAL ENLARGEMENT MODERATE INTR
AVENTRICULAR CONDUCTION DELAY EARLY REPOLARIZATION ABNORMAL ECG 

 

NO PREVIOUS TRACING            

 

DOCTOR:   Merna Cruz  Interpretating Date/Time  03/12/2018 18:13:03

## 2018-03-13 NOTE — HHI.DS
Death Summary Note


Date of Death:  Mar 12, 2018


Time Of Death:  2219


Admission Date


Mar 12, 2018 at 15:13


Admitting Diagnosis





Resp Failure; HyperK; Hypotension


Diagnosis at Time of Death:  


(1) Respiratory failure


ICD Code:  J96.90 - Respiratory failure, unspecified, unspecified whether with 

hypoxia or hypercapnia


Diagnosis:  Principal





(2) Sepsis with multiple organ dysfunction (MOD)


ICD Code:  A41.9 - Sepsis, unspecified organism; R65.20 - Severe sepsis without 

septic shock


Diagnosis:  Principal





(3) Metastatic cancer to liver


ICD Code:  C78.7 - Secondary malignant neoplasm of liver and intrahepatic bile 

duct


Diagnosis:  Principal





(4) Lactic acidosis


ICD Code:  E87.2 - Acidosis


Diagnosis:  Principal





(5) Allergic rhinitis


ICD Code:  J30.9 - Allergic rhinitis, unspecified


Diagnosis:  Secondary





(6) Urinary incontinence


ICD Code:  R32 - Unspecified urinary incontinence


Diagnosis:  Secondary





(7) History of high cholesterol


ICD Code:  Z86.39 - Personal history of other endocrine, nutritional and 

metabolic disease


Diagnosis:  Secondary





(8) History of hypertension


ICD Code:  Z86.79 - Personal history of other diseases of the circulatory system


Diagnosis:  Secondary





(9) Chronic narcotic use


ICD Code:  F11.90 - Opioid use, unspecified, uncomplicated


Diagnosis:  Secondary





(10) Anxiety


ICD Code:  F41.9 - Anxiety disorder, unspecified


Diagnosis:  Secondary





(11) Carotid artery disease


ICD Code:  I77.9 - Disorder of arteries and arterioles, unspecified


Diagnosis:  Secondary





(12) Hyperammonemia


ICD Code:  E72.20 - Disorder of urea cycle metabolism, unspecified


Diagnosis:  Principal





(13) Elevated TSH


ICD Code:  R94.6 - Abnormal results of thyroid function studies


Diagnosis:  Secondary





(14) Elevated lipase


ICD Code:  R74.8 - Abnormal levels of other serum enzymes


Diagnosis:  Secondary





(15) Elevated CPK


ICD Code:  R74.8 - Abnormal levels of other serum enzymes


Diagnosis:  Principal





(16) Elevated levels of transaminase & lactic acid dehydrogenase


ICD Code:  R74.0 - Nonspecific elevation of levels of transaminase and lactic 

acid dehydrogenase [LDH]


Diagnosis:  Principal





(17) Hypercalcemia


ICD Code:  E83.52 - Hypercalcemia


Diagnosis:  Principal





(18) Acute kidney injury


ICD Code:  N17.9 - Acute kidney failure, unspecified


Diagnosis:  Principal





(19) Hyponatremia


ICD Code:  E87.1 - Hypo-osmolality and hyponatremia


Diagnosis:  Secondary





(20) Hyperkalemia


ICD Code:  E87.5 - Hyperkalemia


Diagnosis:  Principal





(21) Osteoporosis


ICD Code:  M81.0 - Age-related osteoporosis without current pathological 

fracture


Diagnosis:  Secondary





Procedures


Right femoral CVL


Brief History


This is a 57-year-old  female.  Date of admission 3/12/2018.  Past 

medical history includes unknown primary cancer, seen by Dr. Hidalgo 

outpatient which she is receiving chemoradiation therapy with metastases known 

to liver, lung, pancreas possibly brain.  Patient has been weak but essentially 

her normal state of health recently.  She has been compliant with her 

medications according to her boyfriend Sharif Trevino.  Today she presented to 

[facility] as she notified EMS complaining of weakness, nausea and 

confusion.





She is evaluated by the ED physician who spoke to boyfriend Gustavo Trevino 

who agreed DNR status.  Contacted son Shukri [phone].  Initially wish to 

keep patient alive until family was available.  However after intubation he 

desires to withdraw care.





Patient is with multisystem organ failure including shock liver, acute kidney 

injury, hyperkalemia, non-STEMI and acute respiratory failure


CBC/BMP:  


3/12/18 1120





Significant Findings





Laboratory Tests








Test


  3/12/18


11:20 3/12/18


16:15 3/12/18


18:00


 


Blood Urea Nitrogen


  87 MG/DL


(7-18) 


  


 


 


Creatinine


  4.88 MG/DL


(0.50-1.00) 


  


 


 


Random Glucose


  65 MG/DL


() 


  


 


 


Total Protein


  5.8 GM/DL


(6.4-8.2) 


  


 


 


Albumin


  1.6 GM/DL


(3.4-5.0) 


  


 


 


Calcium Level


  11.7 MG/DL


(8.5-10.1) 


  


 


 


Alkaline Phosphatase


  959 U/L


() 


  


 


 


Aspartate Amino Transf


(AST/SGOT) 4063 U/L


(15-37) 


  


 


 


Alanine Aminotransferase


(ALT/SGPT) 984 U/L


(10-53) 


  


 


 


Total Bilirubin


  1.3 MG/DL


(0.2-1.0) 


  


 


 


Sodium Level


  130 MEQ/L


(136-145) 


  


 


 


Potassium Level


  6.7 MEQ/L


(3.5-5.1) 


  


 


 


Chloride Level


  92 MEQ/L


() 


  


 


 


Carbon Dioxide Level


  7.7 MEQ/L


(21.0-32.0) 


  


 


 


Anion Gap


  30 MEQ/L


(5-15) 


  


 


 


Estimat Glomerular Filtration


Rate 8 ML/MIN (>89) 


  


  


 


 


Lactic Acid Level


  16.3 mmol/L


(0.4-2.0) 


  


 


 


Ammonia


  164 MCMOL/L


(11-32) 


  


 


 


Total Creatine Kinase


  1487 U/L


() 


  


 


 


Creatine Kinase MB


  20.0 NG/ML


(0.5-3.6) 


  


 


 


Troponin I


  0.26 NG/ML


(0.02-0.05) 


  


 


 


Lipase


  668 U/L


() 


  


 


 


Thyroid Stimulating Hormone


3rd Gen 8.430 uIU/ML


(0.358-3.740) 


  


 


 


Acetaminophen Level


  LESS THAN 2.0


MCG/ML 


  


 


 


Prothrombin Time


  


  18.7 SEC


(9.8-11.6) 


 


 


Activated Partial


Thromboplast Time 


  141.7 SEC


(24.3-30.1) 


 








Imaging





Last Impressions








Chest X-Ray 3/12/18 1049 Signed





Impressions: 





 Service Date/Time:  Monday, March 12, 2018 12:17 - CONCLUSION:  1. Support 





 apparatus in satisfactory position. Minimal basal atelectasis.     Duane Maxwell MD 








Hospital Course


Neuro/Psych:





Allergic rhinitis


Chronic benzodiazepine use


Chronic narcotic use





Patient is currently on propofol/fentanyl drips for sedation/analgesia while 

intubated


Goal RASS -2


Daily sedation vacation


Currently holding cetirizine 10 mg p.o. daily/home medication


Holding alprazolam 0.5 mg p.o. every 6 hours as needed anxiety


Holding morphine sulfate 30 mg twice daily/50 mg every 4 hours as needed pain





CV: 





Elevated troponin


Severe sepsis with multisystem organ failure


History of hypertension


History of dyslipidemia


History of left carotid endarterectomy


Lactic acidosis





Currently on norepinephrine at 12 migrans per minute to maintain mean arterial 

pressures greater than equal to 65


Serial lactates every 6 hours until cleared


Serial troponins every 6 hours 2


Follow-up on EKG


Holding atorvastatin 20 mg p.o. daily/home medication light of elevated 

transaminases


Holding amlodipine 5 mg daily lisinopril 20 mg twice daily acute kidney injury/

hypotension


Continue aspirin 81 mg p.o. daily and clopidogrel 75 mg p.o. daily





Resp: 





Acute respiratory failure


COPD





[facility] 16/525/1/5/100


Ventilator bundle


Albuterol/ipratropium aerosols every 6 hours with albuterol aerosols every 2 

hours as needed dyspnea


Spontaneous breathing trials when clinically indicated


Postintubation ABG/chest x-ray pending





GI: 





Elevated transaminases


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


Elevated lipase


Hyperammonia





OGT to LIWS


Famotidine 10 mg IV twice daily for GI prophylaxis


Docusate/senna 1 tablet twice daily for bowel regimen


Follow-up on liver ultrasound


Avoid hepatotoxic medications


Lactulose 30 cc 4 times daily.  Recheck ammonia level in a.m.





:  





History of bladder incontinence





Byrd catheter for accurate I's and O's


Currently holding oxybutynin 5 mg p.o. daily





Endo:  





History of hypothyroidism





TSH is currently elevated 8.4.


Check free T3 C4 indicated





Renal: 





Acute kidney injury





Creatinine currently 4.9.  Currently anuric.


Currently on sterile water with 3 ampules of sodium bicarbonate 150 cc an hour


Monitor urine output


Accurate I's and O's





Heme:





CBC and coags pending





ID:





Severe sepsis unknown etiology





Vancomycin, aztreonam and metronidazole





Blood cultures 2, sputum and urine ordered





MSK:





Osteoarthritis





PT evaluate and treat





FEN: 





Hyponatremia


Hyperkalemia





Received insulin/D50/Kayexalate/albuterol and bicarbonate.  Recheck in 3 hours





Access


-Right femoral central line day #1 placed 3/12 by ED physician





Prophylax


--GI -famotidine


-DVT -SCD/holding pharmacological prophylaxis pending coags





Critical Care:


The total critical care time was 35 minutes. Time to perform other separately 

billable procedures was not included in the critical care time.


Code Status


DNR


Discussed Condition With


Atilio Watt at [phone] boyfriend Sharif Trevino.  Care plan discussed and 

all questions answered.  No escalation of care.  Norepinephrine maximum 12 mcg/

min.  Continue with bicarbonate drip and antibiotic support for now with 

propofol and fentanyl drips for sedation/analgesia.





Son was able to fax appropriate paperwork to [employer].  I will sign.  Faxed Dr. Rogers to cosign a second signature.  Ventilator withdrawal protocol 

initiated.  Time of death as above











Bentley Leavitt MD Mar 13, 2018 11:20